# Patient Record
Sex: MALE | Race: WHITE
[De-identification: names, ages, dates, MRNs, and addresses within clinical notes are randomized per-mention and may not be internally consistent; named-entity substitution may affect disease eponyms.]

---
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
## 2021-01-04 ENCOUNTER — APPOINTMENT (OUTPATIENT)
Dept: PEDIATRICS | Facility: CLINIC | Age: 21
End: 2021-01-04
Payer: COMMERCIAL

## 2021-01-04 DIAGNOSIS — U07.1 COVID-19: ICD-10-CM

## 2021-01-04 PROCEDURE — 99212 OFFICE O/P EST SF 10 MIN: CPT

## 2021-01-04 PROCEDURE — 99072 ADDL SUPL MATRL&STAF TM PHE: CPT

## 2021-01-04 RX ORDER — FLUTICASONE PROPIONATE 50 UG/1
50 SPRAY, METERED NASAL DAILY
Qty: 1 | Refills: 4 | Status: COMPLETED | COMMUNITY
Start: 2018-09-22 | End: 2021-01-04

## 2021-01-04 RX ORDER — AZITHROMYCIN 250 MG/1
250 TABLET, FILM COATED ORAL
Qty: 6 | Refills: 0 | Status: COMPLETED | COMMUNITY
Start: 2018-09-22 | End: 2021-01-04

## 2021-01-04 NOTE — BEGINNING OF VISIT
[Patient] : patient [FreeTextEntry1] : 21 yo boy here for repeat covid test. he tested positive on 12/23/2020 for uri symptoms. here for repeat test to return to work.

## 2021-01-07 ENCOUNTER — NON-APPOINTMENT (OUTPATIENT)
Age: 21
End: 2021-01-07

## 2021-01-07 LAB — SARS-COV-2 N GENE NPH QL NAA+PROBE: NOT DETECTED
